# Patient Record
Sex: MALE | URBAN - METROPOLITAN AREA
[De-identification: names, ages, dates, MRNs, and addresses within clinical notes are randomized per-mention and may not be internally consistent; named-entity substitution may affect disease eponyms.]

---

## 2019-12-30 ENCOUNTER — OFFICE VISIT (OUTPATIENT)
Dept: URBAN - METROPOLITAN AREA CLINIC 22 | Facility: CLINIC | Age: 69
End: 2019-12-30
Payer: MEDICARE

## 2019-12-30 DIAGNOSIS — H25.13 AGE-RELATED NUCLEAR CATARACT, BILATERAL: ICD-10-CM

## 2019-12-30 DIAGNOSIS — E11.9 TYPE 2 DIABETES MELLITUS W/O COMPLICATION: Primary | ICD-10-CM

## 2019-12-30 PROCEDURE — 92004 COMPRE OPH EXAM NEW PT 1/>: CPT | Performed by: OPTOMETRIST

## 2019-12-30 ASSESSMENT — INTRAOCULAR PRESSURE
OS: 13
OD: 16

## 2019-12-30 NOTE — IMPRESSION/PLAN
Impression: Type 2 diabetes mellitus w/o complication: I00.0. OU. Plan: Recommend yearly diabetic eye exam. Discussed with patient importance of good blood sugar control with regular visits with PCP, compliance with medications, healthy diet and daily exercise.

## 2022-08-09 ENCOUNTER — OFFICE VISIT (OUTPATIENT)
Dept: URBAN - NONMETROPOLITAN AREA CLINIC 14 | Facility: CLINIC | Age: 72
End: 2022-08-09
Payer: MEDICARE

## 2022-08-09 DIAGNOSIS — H53.002 UNSPECIFIED AMBLYOPIA, LEFT EYE: ICD-10-CM

## 2022-08-09 DIAGNOSIS — H25.813 COMBINED FORMS OF AGE-RELATED CATARACT, BILATERAL: Primary | ICD-10-CM

## 2022-08-09 DIAGNOSIS — H33.101 UNSPECIFIED RETINOSCHISIS, RIGHT EYE: ICD-10-CM

## 2022-08-09 PROCEDURE — 99204 OFFICE O/P NEW MOD 45 MIN: CPT | Performed by: OPTOMETRIST

## 2022-08-09 ASSESSMENT — INTRAOCULAR PRESSURE
OS: 12
OD: 16

## 2022-08-09 ASSESSMENT — VISUAL ACUITY
OS: 20/100
OD: 20/40

## 2022-08-09 NOTE — IMPRESSION/PLAN
Impression: Unspecified retinoschisis, right eye: H33.101. Plan: Refer to retina for consult and CE clearance. Educated pt. on all findings.

## 2022-08-09 NOTE — IMPRESSION/PLAN
Impression: Unspecified amblyopia, left eye: H53.002. Plan: Pt reports longstanding lazy eye OS. Educated pt on limited outcome OS.

## 2022-09-07 ENCOUNTER — OFFICE VISIT (OUTPATIENT)
Dept: URBAN - NONMETROPOLITAN AREA CLINIC 13 | Facility: CLINIC | Age: 72
End: 2022-09-07
Payer: MEDICARE

## 2022-09-07 DIAGNOSIS — H33.101 UNSPECIFIED RETINOSCHISIS, RIGHT EYE: Primary | ICD-10-CM

## 2022-09-07 DIAGNOSIS — H33.011 RETINAL DETACHMENT WITH SINGLE BREAK, RIGHT EYE: ICD-10-CM

## 2022-09-07 PROCEDURE — 92134 CPTRZ OPH DX IMG PST SGM RTA: CPT | Performed by: OPHTHALMOLOGY

## 2022-09-07 PROCEDURE — 92004 COMPRE OPH EXAM NEW PT 1/>: CPT | Performed by: OPHTHALMOLOGY

## 2022-09-07 ASSESSMENT — INTRAOCULAR PRESSURE
OD: 17
OS: 16
